# Patient Record
Sex: FEMALE | Race: WHITE | Employment: FULL TIME | URBAN - METROPOLITAN AREA
[De-identification: names, ages, dates, MRNs, and addresses within clinical notes are randomized per-mention and may not be internally consistent; named-entity substitution may affect disease eponyms.]

---

## 2020-12-19 ENCOUNTER — APPOINTMENT (OUTPATIENT)
Dept: ULTRASOUND IMAGING | Facility: CLINIC | Age: 25
End: 2020-12-19
Attending: EMERGENCY MEDICINE
Payer: COMMERCIAL

## 2020-12-19 ENCOUNTER — HOSPITAL ENCOUNTER (EMERGENCY)
Facility: CLINIC | Age: 25
Discharge: HOME | End: 2020-12-19
Attending: EMERGENCY MEDICINE | Admitting: EMERGENCY MEDICINE
Payer: COMMERCIAL

## 2020-12-19 VITALS
HEART RATE: 94 BPM | SYSTOLIC BLOOD PRESSURE: 117 MMHG | OXYGEN SATURATION: 98 % | DIASTOLIC BLOOD PRESSURE: 79 MMHG | TEMPERATURE: 98.7 F | RESPIRATION RATE: 16 BRPM

## 2020-12-19 DIAGNOSIS — O46.90 VAGINAL BLEEDING IN PREGNANCY: ICD-10-CM

## 2020-12-19 DIAGNOSIS — Z32.01 PREGNANCY TEST POSITIVE: ICD-10-CM

## 2020-12-19 LAB
ABO + RH BLD: NORMAL
ABO + RH BLD: NORMAL
ALBUMIN UR-MCNC: NEGATIVE MG/DL
APPEARANCE UR: CLEAR
B-HCG SERPL-ACNC: 362 IU/L (ref 0–5)
BASOPHILS # BLD AUTO: 0 10E9/L (ref 0–0.2)
BASOPHILS NFR BLD AUTO: 0.4 %
BILIRUB UR QL STRIP: NEGATIVE
BLD GP AB SCN SERPL QL: NORMAL
BLOOD BANK CMNT PATIENT-IMP: NORMAL
COLOR UR AUTO: ABNORMAL
DIFFERENTIAL METHOD BLD: ABNORMAL
EOSINOPHIL # BLD AUTO: 0.1 10E9/L (ref 0–0.7)
EOSINOPHIL NFR BLD AUTO: 0.8 %
ERYTHROCYTE [DISTWIDTH] IN BLOOD BY AUTOMATED COUNT: 13.5 % (ref 10–15)
GLUCOSE UR STRIP-MCNC: NEGATIVE MG/DL
HCT VFR BLD AUTO: 36.8 % (ref 35–47)
HGB BLD-MCNC: 11.6 G/DL (ref 11.7–15.7)
HGB UR QL STRIP: ABNORMAL
IMM GRANULOCYTES # BLD: 0 10E9/L (ref 0–0.4)
IMM GRANULOCYTES NFR BLD: 0.3 %
KETONES UR STRIP-MCNC: NEGATIVE MG/DL
LEUKOCYTE ESTERASE UR QL STRIP: NEGATIVE
LYMPHOCYTES # BLD AUTO: 1.7 10E9/L (ref 0.8–5.3)
LYMPHOCYTES NFR BLD AUTO: 20.7 %
MCH RBC QN AUTO: 28.9 PG (ref 26.5–33)
MCHC RBC AUTO-ENTMCNC: 31.5 G/DL (ref 31.5–36.5)
MCV RBC AUTO: 92 FL (ref 78–100)
MONOCYTES # BLD AUTO: 0.7 10E9/L (ref 0–1.3)
MONOCYTES NFR BLD AUTO: 8.9 %
MUCOUS THREADS #/AREA URNS LPF: PRESENT /LPF
NEUTROPHILS # BLD AUTO: 5.5 10E9/L (ref 1.6–8.3)
NEUTROPHILS NFR BLD AUTO: 68.9 %
NITRATE UR QL: NEGATIVE
NRBC # BLD AUTO: 0 10*3/UL
NRBC BLD AUTO-RTO: 0 /100
PH UR STRIP: 6 PH (ref 5–7)
PLATELET # BLD AUTO: 348 10E9/L (ref 150–450)
RBC # BLD AUTO: 4.02 10E12/L (ref 3.8–5.2)
RBC #/AREA URNS AUTO: 20 /HPF (ref 0–2)
SOURCE: ABNORMAL
SP GR UR STRIP: 1.02 (ref 1–1.03)
SPECIMEN EXP DATE BLD: NORMAL
SQUAMOUS #/AREA URNS AUTO: 3 /HPF (ref 0–1)
UROBILINOGEN UR STRIP-MCNC: NORMAL MG/DL (ref 0–2)
WBC # BLD AUTO: 8 10E9/L (ref 4–11)
WBC #/AREA URNS AUTO: 1 /HPF (ref 0–5)

## 2020-12-19 PROCEDURE — 84702 CHORIONIC GONADOTROPIN TEST: CPT | Performed by: EMERGENCY MEDICINE

## 2020-12-19 PROCEDURE — 99285 EMERGENCY DEPT VISIT HI MDM: CPT | Mod: 25

## 2020-12-19 PROCEDURE — 81001 URINALYSIS AUTO W/SCOPE: CPT | Performed by: EMERGENCY MEDICINE

## 2020-12-19 PROCEDURE — 86901 BLOOD TYPING SEROLOGIC RH(D): CPT | Performed by: EMERGENCY MEDICINE

## 2020-12-19 PROCEDURE — 86850 RBC ANTIBODY SCREEN: CPT | Performed by: EMERGENCY MEDICINE

## 2020-12-19 PROCEDURE — 76801 OB US < 14 WKS SINGLE FETUS: CPT

## 2020-12-19 PROCEDURE — 85025 COMPLETE CBC W/AUTO DIFF WBC: CPT | Performed by: EMERGENCY MEDICINE

## 2020-12-19 PROCEDURE — 86900 BLOOD TYPING SEROLOGIC ABO: CPT | Performed by: EMERGENCY MEDICINE

## 2020-12-19 RX ORDER — ACETAMINOPHEN 500 MG
1000 TABLET ORAL
Status: DISCONTINUED | OUTPATIENT
Start: 2020-12-19 | End: 2020-12-19 | Stop reason: HOSPADM

## 2020-12-19 ASSESSMENT — ENCOUNTER SYMPTOMS
FEVER: 0
RHINORRHEA: 0
COUGH: 0
ABDOMINAL PAIN: 1
DIARRHEA: 1
DYSURIA: 0

## 2020-12-19 NOTE — ED AVS SNAPSHOT
[facility]  [address]  [facility] [address]  Phone: [phone]  Fax: [phone]                                    Antonella Joe   MRN: [id number]    Department: [facility]   Date of Visit: 12/19/2020           After Visit Summary Signature Page    I have received my discharge instructions, and my questions have been answered. I have discussed any challenges I see with this plan with the nurse or doctor.    ..........................................................................................................................................  Patient/Patient Representative Signature      ..........................................................................................................................................  Patient Representative Print Name and Relationship to Patient    ..................................................               ................................................  Date                                   Time    ..........................................................................................................................................  Reviewed by Signature/Title    ...................................................              ..............................................  Date                                               Time          22EPIC Rev 08/18

## 2020-12-19 NOTE — ED TRIAGE NOTES
Pt arrives with vaginal bleeding. Took positive pregnancy test 3 days ago, vaginal bleeding started 2 days ago with clots. Concerned for miscarriage. Mild abd cramping. ABCs intact.

## 2020-12-19 NOTE — ED PROVIDER NOTES
History   Chief Complaint:  Vaginal Bleeding     HPI   Antonella Joe is a 25 year old  female who presents with vaginal bleeding. She reports she took a positive pregnancy test 3 days ago, and another 2 days ago. She states she began bleeding heavily at this time, going through multiple super tampons. She also noted some blood clots. She states the bleeding continued yesterday but has lessened today, she is only having minimal spotting at the moment. She additionally notes mild abdominal cramping and some nonbloody diarrhea. The patient notes she had some bleeding with her first pregnancy but not to this extent. She denies dysuria, fever, cough, runny nose, or loss of taste/smell. She is not concerned for STD.     Review of Systems   Constitutional: Negative for fever.   HENT: Negative for rhinorrhea.         Denies loss of taste/smell   Respiratory: Negative for cough.    Gastrointestinal: Positive for abdominal pain and diarrhea.   Genitourinary: Positive for vaginal bleeding. Negative for dysuria.   All other systems reviewed and are negative.    Allergies:  The patient has no known allergies.     Medications:  Lutera  Flagyl  Valtrex    Past Medical History:    The patient denies past medical history.     Past Surgical History:    The patient denies past surgical history.     Family History:    The patient denies past family history.     Social History:  Patient presents alone.    Physical Exam     Patient Vitals for the past 24 hrs:   BP Temp Temp src Pulse Resp SpO2   20 1217 (!) 117/92 98.7  F (37.1  C) Oral 92 18 100 %       Physical Exam  Nursing note and vitals reviewed.  Constitutional: Well nourished. Resting comfortably.   Eyes: Conjunctiva normal.  Pupils are equal, round, and reactive to light.   ENT: Nose normal. Mucous membranes pink and moist.    Neck: Normal range of motion.  CVS: Normal rate, regular rhythm.  Normal heart sounds.  No murmur.  Pulmonary: Lungs clear to auscultation  bilaterally. No wheezes/rales/rhonchi.  GI: Abdomen soft. Nontender, nondistended. No rigidity or guarding.    MSK: No calf tenderness or swelling.  Neuro: Alert. Follows simple commands.  Skin: Skin is warm and dry. No rash noted.   Psychiatric: Normal affect.       Emergency Department Course     Imaging:    US OB <<14 Weeks w Transvaginal:  1.  Pregnancy of unknown location. A 3 mm cyst in the endocervical   canal could either represent a cervical nabothian cyst or tiny   gestational sac (corresponding to a gestational age of 4 weeks and 6   days). No fetal pole or yolk sac within this cystic lesion. Correlate   with serum beta hCG levels and consider a follow-up ultrasound in 7-14   days as clinically indicated.   2.  Normal ovaries. No adnexal masses or pelvic free fluid, as per radiology.     Laboratory:    CBC: WBC: 8.0, HGB: 11.6 (L), PLT: 348    HCG Quantitative Blood: 362 (H)  ABO/Rh Type and Screen: O, RH Pos, Antibody Neg     UA: Blood: Moderate, RBC: 20 (H), Squamous Epithelial: 3 (H), Mucous: Present, o/w Negative    Emergency Department Course:    Reviewed:  I reviewed the patient's nursing notes, vitals, past medical records, Care Everywhere.     Assessments:  1230   I evaluated the patient and performed an exam as above.    1405   I updated the patient on results and discussed plan of care.    Disposition:  The patient was discharged to home.       Impression & Plan     Medical Decision Making:  Patient is a 25-year-old female presenting with vaginal bleeding and reported early pregnancy.  She is nontoxic on arrival in no significant distress.  No significant abdominal tenderness on exam.  I offered formal pelvic exam though patient denies any heavy bleeding at this time and is requesting to defer this exam.  H/H with chronic appearing anemia.  She is not a RhoGam candidate today.  She did undergo a formal ultrasound which is unfortunately inconclusive at this point.  I discussed with patient there  appears to be a possible cyst in the endocervical canal which could represent gestational sac versus cervical nabothian cyst.  No fetal pole or yolk sac identified.  Counseled patient on importance of close OBGYN f/u in 48 hours for repeat BHCG as well as likely repeat US.  Miscarriage and ectopic precautions given.  Return for bleeding >1 pad/hour, syncope, increasing abdominal pain or should symptoms worsen.  I discussed tylenol safe in pregnancy.        Diagnosis:    ICD-10-CM    1. Pregnancy test positive  Z32.01    2. Vaginal bleeding in pregnancy  O46.90        Discharge Medications:  New Prescriptions    No medications on file       Scribe Disclosure:  Anu CRUZ, am serving as a scribe at 12:30 PM on 12/19/2020 to document services personally performed by Tersea Anton DO based on my observations and the provider's statements to me.      Teresa Anotn DO  12/19/20 [id number]

## 2021-06-20 ENCOUNTER — HEALTH MAINTENANCE LETTER (OUTPATIENT)
Age: 26
End: 2021-06-20

## 2021-06-22 ENCOUNTER — HOSPITAL ENCOUNTER (EMERGENCY)
Facility: CLINIC | Age: 26
Discharge: LEFT BEFORE DISCHARGE | End: 2021-06-22
Payer: COMMERCIAL

## 2021-06-22 ENCOUNTER — NURSE TRIAGE (OUTPATIENT)
Dept: NURSING | Facility: CLINIC | Age: 26
End: 2021-06-22

## 2021-06-22 VITALS
TEMPERATURE: 98.9 F | HEART RATE: 79 BPM | RESPIRATION RATE: 18 BRPM | DIASTOLIC BLOOD PRESSURE: 81 MMHG | OXYGEN SATURATION: 100 % | SYSTOLIC BLOOD PRESSURE: 115 MMHG

## 2021-06-23 NOTE — TELEPHONE ENCOUNTER
Pt called stating she ripped her nail, and said it is swelling, bleeding alot , tingling, sensative and wandering if she can came in to the ER to have the hunging nail with the cuticle cut off while using numbing the area.      She was informed yes the ER can do that, and she stated okay.      Jose Hernández RN  [facility]       COVID 19 Nurse Triage Plan/Patient Instructions    Please be aware that novel coronavirus (COVID-19) may be circulating in the community. If you develop symptoms such as fever, cough, or SOB or if you have concerns about the presence of another infection including coronavirus (COVID-19), please contact your health care provider or visit [email or web address].     Disposition/Instructions    ED Visit recommended. Follow protocol based instructions.     Bring Your Own Device:  Please also bring your smart device(s) (smart phones, tablets, laptops) and their charging cables for your personal use and to communicate with your care team during your visit.    Thank you for taking steps to prevent the spread of this virus.  o Limit your contact with others.  o Wear a simple mask to cover your cough.  o Wash your hands well and often.    Resources    [facility]: About COVID-19: [email or web address]    CDC: What to Do If You're Sick: www.cdc.gov/coronavirus/2019-ncov/about/steps-when-sick.html    CDC: Ending Home Isolation: www.cdc.gov/coronavirus/2019-ncov/hcp/disposition-in-home-patients.html     CDC: Caring for Someone: www.cdc.gov/coronavirus/2019-ncov/if-you-are-sick/care-for-someone.html     [facility]: Interim Guidance for Hospital Discharge to Home: www.health.[facility].[place]/diseases/coronavirus/hcp/hospdischarge.pdf    [facility] clinical trials (COVID-19 research studies): [email or web address]     Below are the COVID-19 hotlines at the [place] Department of [facility] ([facility]). Interpreters are available.   o For Competitor  questions: Call 365-015-5011 or 1-422.343.2153 (7 a.m. to 7 p.m.)  o For questions about schools and childcare: Call 701-312-2429 or 1-671.813.8303 (7 a.m. to 7 p.m.)                      Reason for Disposition    Patient sounds very sick or weak to the triager    Additional Information    Negative: Followed a finger injury    Negative: Wound looks infected    Negative: Caused by an animal bite    Negative: Caused by frostbite    Negative: Caused by a human bite    Negative: Hand or wrist pain is main symptom    Negative: [1] Swollen joint AND [2] fever    Negative: [1] Looks infected (e.g., spreading redness, red streak, pus) AND [2] fever    Negative: [1] Looks infected (e.g., spreading redness, red streak, pus) AND [2] severe pain with movement    Protocols used: FINGER PAIN-A-AH

## 2021-06-23 NOTE — ED TRIAGE NOTES
Pt arrives with c/o bending her right middle finger's nail back all the way. Pt reports it is still oozing blood and hurts whenever she touches something. ABCs intact.

## 2021-10-11 ENCOUNTER — HEALTH MAINTENANCE LETTER (OUTPATIENT)
Age: 26
End: 2021-10-11

## 2022-07-17 ENCOUNTER — HEALTH MAINTENANCE LETTER (OUTPATIENT)
Age: 27
End: 2022-07-17

## 2022-09-24 ENCOUNTER — HEALTH MAINTENANCE LETTER (OUTPATIENT)
Age: 27
End: 2022-09-24

## 2023-08-05 ENCOUNTER — HEALTH MAINTENANCE LETTER (OUTPATIENT)
Age: 28
End: 2023-08-05